# Patient Record
Sex: MALE | Race: WHITE | NOT HISPANIC OR LATINO | Employment: OTHER | URBAN - NONMETROPOLITAN AREA
[De-identification: names, ages, dates, MRNs, and addresses within clinical notes are randomized per-mention and may not be internally consistent; named-entity substitution may affect disease eponyms.]

---

## 2017-06-11 ENCOUNTER — HOSPITAL ENCOUNTER (EMERGENCY)
Facility: HOSPITAL | Age: 37
Discharge: HOME | End: 2017-06-11
Attending: EMERGENCY MEDICINE | Admitting: EMERGENCY MEDICINE

## 2017-06-11 ENCOUNTER — APPOINTMENT (OUTPATIENT)
Dept: GENERAL RADIOLOGY | Facility: HOSPITAL | Age: 37
End: 2017-06-11

## 2017-06-11 VITALS
DIASTOLIC BLOOD PRESSURE: 83 MMHG | WEIGHT: 258 LBS | HEART RATE: 90 BPM | OXYGEN SATURATION: 100 % | TEMPERATURE: 98 F | HEIGHT: 68 IN | RESPIRATION RATE: 16 BRPM | BODY MASS INDEX: 39.1 KG/M2 | SYSTOLIC BLOOD PRESSURE: 126 MMHG

## 2017-06-11 DIAGNOSIS — S90.122A CONTUSION OF LESSER TOE OF LEFT FOOT WITHOUT DAMAGE TO NAIL, INITIAL ENCOUNTER: Primary | ICD-10-CM

## 2017-06-11 PROCEDURE — 99282 EMERGENCY DEPT VISIT SF MDM: CPT

## 2017-06-11 PROCEDURE — 73630 X-RAY EXAM OF FOOT: CPT

## 2017-06-11 RX ORDER — AMITRIPTYLINE HYDROCHLORIDE 150 MG/1
100 TABLET, FILM COATED ORAL NIGHTLY
COMMUNITY

## 2017-06-11 RX ORDER — LISINOPRIL 10 MG/1
10 TABLET ORAL DAILY
COMMUNITY

## 2017-06-11 RX ORDER — PRIMIDONE 250 MG/1
250 TABLET ORAL 2 TIMES DAILY
COMMUNITY

## 2017-06-11 RX ORDER — RISPERIDONE 2 MG/1
2 TABLET ORAL 2 TIMES DAILY
COMMUNITY
End: 2020-02-16

## 2017-06-11 RX ORDER — MINOCYCLINE HYDROCHLORIDE 100 MG/1
100 CAPSULE ORAL 2 TIMES DAILY
COMMUNITY
End: 2020-02-16

## 2017-06-11 RX ORDER — METOPROLOL SUCCINATE 50 MG/1
50 TABLET, EXTENDED RELEASE ORAL DAILY
COMMUNITY

## 2017-06-11 RX ORDER — DIVALPROEX SODIUM 500 MG/1
500 TABLET, DELAYED RELEASE ORAL 2 TIMES DAILY
COMMUNITY

## 2017-06-11 RX ORDER — CETIRIZINE HYDROCHLORIDE 10 MG/1
10 TABLET ORAL DAILY
COMMUNITY

## 2017-06-11 NOTE — ED PROVIDER NOTES
Subjective   HPI Comments: 36-year-old male presents with a foot injury, he has oozing to his left fourth and fifth toe.  He is a patient of [facility] and has mental disability and is a poor historian, he was on a home visit and came back with bruising of the left fourth and fifth toe and possibly stubbing his toe.      History provided by:  Patient   used: No        Review of Systems   Musculoskeletal:        Left fourth and fifth toe bruising   Skin:        Left fourth and fifth toe bruising   All other systems reviewed and are negative.      Past Medical History:   Diagnosis Date   • Depression    • Hyperlipidemia    • Hypertension    • Intellectual disability    • Kyphosis    • Mood disorder    • Seizures        No Known Allergies    History reviewed. No pertinent surgical history.    History reviewed. No pertinent family history.    Social History     Social History   • Marital status: Single     Spouse name: N/A   • Number of children: N/A   • Years of education: N/A     Social History Main Topics   • Smoking status: Never Smoker   • Smokeless tobacco: None   • Alcohol use No   • Drug use: No   • Sexual activity: Not Asked     Other Topics Concern   • None     Social History Narrative   • None           Objective   Physical Exam   Constitutional: He is oriented to person, place, and time. He appears well-developed and well-nourished.   HENT:   Head: Normocephalic and atraumatic.   Left Ear: External ear normal.   Eyes: EOM are normal. Pupils are equal, round, and reactive to light.   Neck: Normal range of motion.   Cardiovascular: Normal rate and regular rhythm.    Pulmonary/Chest: Effort normal and breath sounds normal.   Abdominal: Soft. Bowel sounds are normal.   Musculoskeletal: Normal range of motion. He exhibits tenderness.   Left fourth and fifth toe bruising mild tenderness to palpation   Neurological: He is alert and oriented to person, place, and time.   Skin: Skin is  warm and dry.   Psychiatric: He has a normal mood and affect. His behavior is normal. Judgment and thought content normal.   Nursing note and vitals reviewed.      Procedures         ED Course  ED Course                  MDM    Final diagnoses:   Contusion of lesser toe of left foot without damage to nail, initial encounter            Howie King Jr., PA-C  06/11/17 1938

## 2017-08-18 ENCOUNTER — HOSPITAL ENCOUNTER (EMERGENCY)
Facility: HOSPITAL | Age: 37
Discharge: HOME | End: 2017-08-18
Attending: EMERGENCY MEDICINE | Admitting: EMERGENCY MEDICINE

## 2017-08-18 ENCOUNTER — APPOINTMENT (OUTPATIENT)
Dept: GENERAL RADIOLOGY | Facility: HOSPITAL | Age: 37
End: 2017-08-18

## 2017-08-18 VITALS
RESPIRATION RATE: 16 BRPM | TEMPERATURE: 97.8 F | DIASTOLIC BLOOD PRESSURE: 82 MMHG | HEIGHT: 69 IN | WEIGHT: 240 LBS | SYSTOLIC BLOOD PRESSURE: 117 MMHG | BODY MASS INDEX: 35.55 KG/M2 | OXYGEN SATURATION: 100 % | HEART RATE: 74 BPM
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
## 2017-08-18 DIAGNOSIS — R07.89 CHEST WALL PAIN: ICD-10-CM

## 2017-08-18 PROCEDURE — 71020 HC CHEST PA AND LATERAL: CPT

## 2017-08-18 PROCEDURE — 96372 THER/PROPH/DIAG INJ SC/IM: CPT

## 2017-08-18 PROCEDURE — 25010000002 KETOROLAC TROMETHAMINE PER 15 MG: Performed by: EMERGENCY MEDICINE

## 2017-08-18 PROCEDURE — 93005 ELECTROCARDIOGRAM TRACING: CPT

## 2017-08-18 PROCEDURE — 93005 ELECTROCARDIOGRAM TRACING: CPT | Performed by: EMERGENCY MEDICINE

## 2017-08-18 PROCEDURE — 99284 EMERGENCY DEPT VISIT MOD MDM: CPT

## 2017-08-18 RX ORDER — KETOROLAC TROMETHAMINE 30 MG/ML
30 INJECTION, SOLUTION INTRAMUSCULAR; INTRAVENOUS EVERY 6 HOURS PRN
Status: DISCONTINUED | OUTPATIENT
Start: 2017-08-18 | End: 2017-08-18 | Stop reason: HOSPADM

## 2017-08-18 RX ORDER — IBUPROFEN 800 MG/1
800 TABLET ORAL EVERY 8 HOURS PRN
Qty: 30 TABLET | Refills: 0 | Status: SHIPPED | OUTPATIENT
Start: 2017-08-18

## 2017-08-18 RX ORDER — KETOROLAC TROMETHAMINE 30 MG/ML
30 INJECTION, SOLUTION INTRAMUSCULAR; INTRAVENOUS ONCE
Status: DISCONTINUED | OUTPATIENT
Start: 2017-08-18 | End: 2017-08-18

## 2017-08-18 RX ADMIN — KETOROLAC TROMETHAMINE 30 MG: 30 INJECTION, SOLUTION INTRAMUSCULAR at 10:36
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
## 2022-09-07 ENCOUNTER — HOSPITAL ENCOUNTER (EMERGENCY)
Facility: HOSPITAL | Age: 42
Discharge: HOME | End: 2022-09-07
Attending: EMERGENCY MEDICINE | Admitting: EMERGENCY MEDICINE

## 2022-09-07 ENCOUNTER — APPOINTMENT (OUTPATIENT)
Dept: GENERAL RADIOLOGY | Facility: HOSPITAL | Age: 42
End: 2022-09-07

## 2022-09-07 VITALS
DIASTOLIC BLOOD PRESSURE: 96 MMHG | SYSTOLIC BLOOD PRESSURE: 144 MMHG | HEIGHT: 72 IN | TEMPERATURE: 98 F | OXYGEN SATURATION: 99 % | HEART RATE: 73 BPM | BODY MASS INDEX: 27.09 KG/M2 | RESPIRATION RATE: 16 BRPM | WEIGHT: 200 LBS

## 2022-09-07 DIAGNOSIS — R07.9 CHEST PAIN, UNSPECIFIED TYPE: Primary | ICD-10-CM

## 2022-09-07 LAB
ALBUMIN SERPL-MCNC: 3.9 G/DL (ref 3.5–5.2)
ALBUMIN/GLOB SERPL: 1.1 G/DL
ALP SERPL-CCNC: 68 U/L (ref 39–117)
ALT SERPL W P-5'-P-CCNC: 22 U/L (ref 1–41)
ANION GAP SERPL CALCULATED.3IONS-SCNC: 7.7 MMOL/L (ref 5–15)
AST SERPL-CCNC: 23 U/L (ref 1–40)
BILIRUB SERPL-MCNC: 0.2 MG/DL (ref 0–1.2)
BUN SERPL-MCNC: 12 MG/DL (ref 6–20)
BUN/CREAT SERPL: 16 (ref 7–25)
CALCIUM SPEC-SCNC: 9.4 MG/DL (ref 8.6–10.5)
CHLORIDE SERPL-SCNC: 103 MMOL/L (ref 98–107)
CO2 SERPL-SCNC: 30.3 MMOL/L (ref 22–29)
CREAT SERPL-MCNC: 0.75 MG/DL (ref 0.76–1.27)
DEPRECATED RDW RBC AUTO: 41.6 FL (ref 37–54)
EGFRCR SERPLBLD CKD-EPI 2021: 117 ML/MIN/1.73
ERYTHROCYTE [DISTWIDTH] IN BLOOD BY AUTOMATED COUNT: 12.5 % (ref 12.3–15.4)
GLOBULIN UR ELPH-MCNC: 3.4 GM/DL
GLUCOSE SERPL-MCNC: 92 MG/DL (ref 65–99)
HCT VFR BLD AUTO: 38.7 % (ref 37.5–51)
HGB BLD-MCNC: 13.7 G/DL (ref 13–17.7)
HOLD SPECIMEN: NORMAL
HOLD SPECIMEN: NORMAL
LYMPHOCYTES # BLD MANUAL: 3.38 10*3/MM3 (ref 0.7–3.1)
LYMPHOCYTES NFR BLD MANUAL: 13 % (ref 5–12)
MCH RBC QN AUTO: 32.3 PG (ref 26.6–33)
MCHC RBC AUTO-ENTMCNC: 35.4 G/DL (ref 31.5–35.7)
MCV RBC AUTO: 91.3 FL (ref 79–97)
MONOCYTES # BLD: 0.73 10*3/MM3 (ref 0.1–0.9)
NEUTROPHILS # BLD AUTO: 1.52 10*3/MM3 (ref 1.7–7)
NEUTROPHILS NFR BLD MANUAL: 26 % (ref 42.7–76)
NEUTS BAND NFR BLD MANUAL: 1 % (ref 0–5)
PLATELET # BLD AUTO: 163 10*3/MM3 (ref 140–450)
PMV BLD AUTO: 10.2 FL (ref 6–12)
POTASSIUM SERPL-SCNC: 3.8 MMOL/L (ref 3.5–5.2)
PROT SERPL-MCNC: 7.3 G/DL (ref 6–8.5)
RBC # BLD AUTO: 4.24 10*6/MM3 (ref 4.14–5.8)
RBC MORPH BLD: NORMAL
SCAN SLIDE: NORMAL
SMALL PLATELETS BLD QL SMEAR: ADEQUATE
SODIUM SERPL-SCNC: 141 MMOL/L (ref 136–145)
TROPONIN T SERPL-MCNC: <0.01 NG/ML (ref 0–0.03)
VARIANT LYMPHS NFR BLD MANUAL: 4 % (ref 0–5)
VARIANT LYMPHS NFR BLD MANUAL: 56 % (ref 19.6–45.3)
WBC MORPH BLD: NORMAL
WBC NRBC COR # BLD: 5.64 10*3/MM3 (ref 3.4–10.8)
WHOLE BLOOD HOLD COAG: NORMAL
WHOLE BLOOD HOLD SPECIMEN: NORMAL

## 2022-09-07 PROCEDURE — 71045 X-RAY EXAM CHEST 1 VIEW: CPT

## 2022-09-07 PROCEDURE — 99284 EMERGENCY DEPT VISIT MOD MDM: CPT

## 2022-09-07 PROCEDURE — 85025 COMPLETE CBC W/AUTO DIFF WBC: CPT | Performed by: EMERGENCY MEDICINE

## 2022-09-07 PROCEDURE — 85007 BL SMEAR W/DIFF WBC COUNT: CPT | Performed by: EMERGENCY MEDICINE

## 2022-09-07 PROCEDURE — 80053 COMPREHEN METABOLIC PANEL: CPT | Performed by: EMERGENCY MEDICINE

## 2022-09-07 PROCEDURE — 84484 ASSAY OF TROPONIN QUANT: CPT | Performed by: EMERGENCY MEDICINE

## 2022-09-07 PROCEDURE — 93005 ELECTROCARDIOGRAM TRACING: CPT

## 2022-09-07 RX ORDER — MIRTAZAPINE 15 MG/1
15 TABLET, FILM COATED ORAL NIGHTLY
COMMUNITY

## 2022-09-07 RX ORDER — SODIUM CHLORIDE 0.9 % (FLUSH) 0.9 %
10 SYRINGE (ML) INJECTION AS NEEDED
Status: DISCONTINUED | OUTPATIENT
Start: 2022-09-07 | End: 2022-09-08 | Stop reason: HOSPADM

## 2022-09-07 RX ORDER — LEVOCETIRIZINE DIHYDROCHLORIDE 5 MG/1
5 TABLET, FILM COATED ORAL EVERY EVENING
COMMUNITY

## 2022-09-08 NOTE — ED NOTES
Spoke with medical coordinator of [facility], she states she does not feel comfortable transporting the patient back home and requested I call EMS.

## 2022-09-08 NOTE — ED PROVIDER NOTES
Subjective       41-year-old male presents to the ED with a chief complaint of chest pain.  The patient has an intellectual disability and presents from [facility] a local facility.  He told them that he was having chest discomfort so they sent him to the ED.  On my evaluation the patient denies chest pain.  He denies shortness of breath.  No cough or wheeze.  No fever or chills.  No nausea vomiting diarrhea or abdominal pain.  Apparently the chest pain of been present for at least 1 day.  No lightheadedness or dizziness.  No prior treatments or limiting factors.  No prior evaluations.  No known history of coronary artery disease.  No other complaints at this time.          Review of Systems   Constitutional: Negative for fatigue and fever.   Respiratory: Negative for chest tightness, shortness of breath and wheezing.    Cardiovascular: Positive for chest pain. Negative for palpitations and leg swelling.   Gastrointestinal: Negative for abdominal pain, diarrhea and nausea.   All other systems reviewed and are negative.      Past Medical History:   Diagnosis Date   • Depression    • Hyperlipidemia    • Hypertension    • Intellectual disability    • Kyphosis    • Mood disorder (HCC)    • Seizures (HCC)        No Known Allergies    History reviewed. No pertinent surgical history.    History reviewed. No pertinent family history.    Social History     Socioeconomic History   • Marital status: Single   Tobacco Use   • Smoking status: Never Smoker   Substance and Sexual Activity   • Alcohol use: No   • Drug use: No           Objective   Physical Exam  Vitals and nursing note reviewed.   Constitutional:       General: He is not in acute distress.     Appearance: He is well-developed. He is not diaphoretic.   HENT:      Head: Normocephalic and atraumatic.      Nose: Nose normal.   Eyes:      Conjunctiva/sclera: Conjunctivae normal.      Pupils: Pupils are equal, round, and reactive to light.   Cardiovascular:       Rate and Rhythm: Normal rate and regular rhythm.   Pulmonary:      Effort: Pulmonary effort is normal. No respiratory distress.      Breath sounds: Normal breath sounds.   Abdominal:      General: There is no distension.      Palpations: Abdomen is soft.      Tenderness: There is no abdominal tenderness.   Musculoskeletal:         General: No deformity.   Neurological:      Mental Status: He is alert and oriented to person, place, and time.      Cranial Nerves: No cranial nerve deficit.      Coordination: Coordination normal.         Procedures           ED Course  ED Course as of 09/07/22 2226   Wed Sep 07, 2022   2154 EKG interpreted by me.  Sinus rhythm.  Rate of 70.  No ST segment or T wave changes.  Normal EKG [CG]      ED Course User Index  [CG] Vinny Rangel DO                                 Heart score of 1    PERC criteria negative    Chest x-ray negative for acute process          MDM  41-year-old male presents to the ED with a chief complaint of chest pain.  Pain is been present for 1 day.  EKG is normal.  Chest x-ray is negative for acute process.  Vital signs are stable.  Heart rate of 71.  Pulse ox 99% on room air.  Blood pressure 122/81.  Labs are reassuring.  Troponin negative.  Heart score of 1.  Patient is appropriate for discharge follow-up outpatient as needed.        Final diagnoses:   Chest pain, unspecified type       ED Disposition  ED Disposition     ED Disposition   Discharge    Condition   Stable    Comment   --             Brigitte Burns, APRN  [address], [address] [address]  [phone]               Medication List      No changes were made to your prescriptions during this visit.          Vinny Rangel DO  09/07/22 2226

## 2022-09-08 NOTE — ED NOTES
Contacted [facility] to request EMS transport back to [facility].

## 2022-09-25 ENCOUNTER — HOSPITAL ENCOUNTER (EMERGENCY)
Facility: HOSPITAL | Age: 42
Discharge: HOME | End: 2022-09-25
Attending: EMERGENCY MEDICINE | Admitting: EMERGENCY MEDICINE

## 2022-09-25 ENCOUNTER — APPOINTMENT (OUTPATIENT)
Dept: GENERAL RADIOLOGY | Facility: HOSPITAL | Age: 42
End: 2022-09-25

## 2022-09-25 VITALS
HEART RATE: 65 BPM | HEIGHT: 72 IN | OXYGEN SATURATION: 100 % | SYSTOLIC BLOOD PRESSURE: 119 MMHG | WEIGHT: 199.96 LBS | RESPIRATION RATE: 16 BRPM | TEMPERATURE: 98.3 F | DIASTOLIC BLOOD PRESSURE: 78 MMHG | BODY MASS INDEX: 27.08 KG/M2

## 2022-09-25 DIAGNOSIS — M54.50 ACUTE LOW BACK PAIN, UNSPECIFIED BACK PAIN LATERALITY, UNSPECIFIED WHETHER SCIATICA PRESENT: Primary | ICD-10-CM

## 2022-09-25 PROCEDURE — 72100 X-RAY EXAM L-S SPINE 2/3 VWS: CPT

## 2022-09-25 PROCEDURE — 99283 EMERGENCY DEPT VISIT LOW MDM: CPT

## 2022-09-25 RX ORDER — ACETAMINOPHEN 500 MG
500 TABLET ORAL ONCE
Status: COMPLETED | OUTPATIENT
Start: 2022-09-25 | End: 2022-09-25

## 2022-09-25 RX ADMIN — ACETAMINOPHEN 500 MG: 500 TABLET, FILM COATED ORAL at 20:47

## 2022-09-26 NOTE — DISCHARGE INSTRUCTIONS
Normal back X-ray. Takes OTC Tylenol or Motrin as needed for pain. Take other medications as usual. Return to ER for any new or worsening symptoms.
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
## 2023-02-15 ENCOUNTER — HOSPITAL ENCOUNTER (EMERGENCY)
Facility: HOSPITAL | Age: 43
Discharge: HOME | End: 2023-02-16
Attending: EMERGENCY MEDICINE | Admitting: EMERGENCY MEDICINE
Payer: MEDICARE

## 2023-02-15 ENCOUNTER — APPOINTMENT (OUTPATIENT)
Dept: GENERAL RADIOLOGY | Facility: HOSPITAL | Age: 43
End: 2023-02-15
Payer: MEDICARE

## 2023-02-15 DIAGNOSIS — R07.9 CHEST PAIN, UNSPECIFIED TYPE: Primary | ICD-10-CM

## 2023-02-15 PROCEDURE — 93005 ELECTROCARDIOGRAM TRACING: CPT | Performed by: EMERGENCY MEDICINE

## 2023-02-15 PROCEDURE — 93005 ELECTROCARDIOGRAM TRACING: CPT

## 2023-02-15 PROCEDURE — 99284 EMERGENCY DEPT VISIT MOD MDM: CPT

## 2023-02-15 PROCEDURE — 84484 ASSAY OF TROPONIN QUANT: CPT | Performed by: EMERGENCY MEDICINE

## 2023-02-15 PROCEDURE — 71045 X-RAY EXAM CHEST 1 VIEW: CPT

## 2023-02-15 PROCEDURE — 85025 COMPLETE CBC W/AUTO DIFF WBC: CPT | Performed by: EMERGENCY MEDICINE

## 2023-02-15 PROCEDURE — 80053 COMPREHEN METABOLIC PANEL: CPT | Performed by: EMERGENCY MEDICINE

## 2023-02-15 RX ORDER — SODIUM CHLORIDE 0.9 % (FLUSH) 0.9 %
10 SYRINGE (ML) INJECTION AS NEEDED
Status: DISCONTINUED | OUTPATIENT
Start: 2023-02-15 | End: 2023-02-16 | Stop reason: HOSPADM

## 2023-02-16 VITALS
DIASTOLIC BLOOD PRESSURE: 76 MMHG | HEART RATE: 74 BPM | SYSTOLIC BLOOD PRESSURE: 105 MMHG | WEIGHT: 200 LBS | TEMPERATURE: 98.2 F | RESPIRATION RATE: 20 BRPM | OXYGEN SATURATION: 98 %

## 2023-02-16 LAB
ALBUMIN SERPL-MCNC: 3.7 G/DL (ref 3.5–5.2)
ALBUMIN/GLOB SERPL: 1.1 G/DL
ALP SERPL-CCNC: 54 U/L (ref 39–117)
ALT SERPL W P-5'-P-CCNC: 12 U/L (ref 1–41)
ANION GAP SERPL CALCULATED.3IONS-SCNC: 6.8 MMOL/L (ref 5–15)
AST SERPL-CCNC: 20 U/L (ref 1–40)
B PARAPERT DNA SPEC QL NAA+PROBE: NOT DETECTED
B PERT DNA SPEC QL NAA+PROBE: NOT DETECTED
BASOPHILS # BLD AUTO: 0.03 10*3/MM3 (ref 0–0.2)
BASOPHILS NFR BLD AUTO: 0.7 % (ref 0–1.5)
BILIRUB SERPL-MCNC: 0.3 MG/DL (ref 0–1.2)
BUN SERPL-MCNC: 14 MG/DL (ref 6–20)
BUN/CREAT SERPL: 18.9 (ref 7–25)
C PNEUM DNA NPH QL NAA+NON-PROBE: NOT DETECTED
CALCIUM SPEC-SCNC: 8.9 MG/DL (ref 8.6–10.5)
CHLORIDE SERPL-SCNC: 102 MMOL/L (ref 98–107)
CO2 SERPL-SCNC: 29.2 MMOL/L (ref 22–29)
CREAT SERPL-MCNC: 0.74 MG/DL (ref 0.76–1.27)
DEPRECATED RDW RBC AUTO: 38.1 FL (ref 37–54)
EGFRCR SERPLBLD CKD-EPI 2021: 116.7 ML/MIN/1.73
EOSINOPHIL # BLD AUTO: 0.03 10*3/MM3 (ref 0–0.4)
EOSINOPHIL NFR BLD AUTO: 0.7 % (ref 0.3–6.2)
ERYTHROCYTE [DISTWIDTH] IN BLOOD BY AUTOMATED COUNT: 11.9 % (ref 12.3–15.4)
FLUAV SUBTYP SPEC NAA+PROBE: NOT DETECTED
FLUBV RNA ISLT QL NAA+PROBE: NOT DETECTED
GLOBULIN UR ELPH-MCNC: 3.3 GM/DL
GLUCOSE SERPL-MCNC: 93 MG/DL (ref 65–99)
HADV DNA SPEC NAA+PROBE: NOT DETECTED
HCOV 229E RNA SPEC QL NAA+PROBE: NOT DETECTED
HCOV HKU1 RNA SPEC QL NAA+PROBE: NOT DETECTED
HCOV NL63 RNA SPEC QL NAA+PROBE: NOT DETECTED
HCOV OC43 RNA SPEC QL NAA+PROBE: NOT DETECTED
HCT VFR BLD AUTO: 33.1 % (ref 37.5–51)
HGB BLD-MCNC: 11.9 G/DL (ref 13–17.7)
HMPV RNA NPH QL NAA+NON-PROBE: NOT DETECTED
HOLD SPECIMEN: NORMAL
HOLD SPECIMEN: NORMAL
HPIV1 RNA ISLT QL NAA+PROBE: NOT DETECTED
HPIV2 RNA SPEC QL NAA+PROBE: NOT DETECTED
HPIV3 RNA NPH QL NAA+PROBE: NOT DETECTED
HPIV4 P GENE NPH QL NAA+PROBE: NOT DETECTED
IMM GRANULOCYTES # BLD AUTO: 0.01 10*3/MM3 (ref 0–0.05)
IMM GRANULOCYTES NFR BLD AUTO: 0.2 % (ref 0–0.5)
LYMPHOCYTES # BLD AUTO: 2.48 10*3/MM3 (ref 0.7–3.1)
LYMPHOCYTES NFR BLD AUTO: 54 % (ref 19.6–45.3)
M PNEUMO IGG SER IA-ACNC: NOT DETECTED
MCH RBC QN AUTO: 32.1 PG (ref 26.6–33)
MCHC RBC AUTO-ENTMCNC: 36 G/DL (ref 31.5–35.7)
MCV RBC AUTO: 89.2 FL (ref 79–97)
MONOCYTES # BLD AUTO: 0.54 10*3/MM3 (ref 0.1–0.9)
MONOCYTES NFR BLD AUTO: 11.8 % (ref 5–12)
NEUTROPHILS NFR BLD AUTO: 1.5 10*3/MM3 (ref 1.7–7)
NEUTROPHILS NFR BLD AUTO: 32.6 % (ref 42.7–76)
NRBC BLD AUTO-RTO: 0 /100 WBC (ref 0–0.2)
PLATELET # BLD AUTO: 143 10*3/MM3 (ref 140–450)
PMV BLD AUTO: 11 FL (ref 6–12)
POTASSIUM SERPL-SCNC: 4.2 MMOL/L (ref 3.5–5.2)
PROT SERPL-MCNC: 7 G/DL (ref 6–8.5)
RBC # BLD AUTO: 3.71 10*6/MM3 (ref 4.14–5.8)
RHINOVIRUS RNA SPEC NAA+PROBE: NOT DETECTED
RSV RNA NPH QL NAA+NON-PROBE: NOT DETECTED
SARS-COV-2 RNA NPH QL NAA+NON-PROBE: NOT DETECTED
SODIUM SERPL-SCNC: 138 MMOL/L (ref 136–145)
TROPONIN T SERPL HS-MCNC: <6 NG/L
WBC NRBC COR # BLD: 4.59 10*3/MM3 (ref 3.4–10.8)
WHOLE BLOOD HOLD COAG: NORMAL
WHOLE BLOOD HOLD SPECIMEN: NORMAL

## 2023-02-16 PROCEDURE — 0202U NFCT DS 22 TRGT SARS-COV-2: CPT | Performed by: EMERGENCY MEDICINE

## 2023-02-16 PROCEDURE — 93005 ELECTROCARDIOGRAM TRACING: CPT | Performed by: EMERGENCY MEDICINE

## 2023-02-16 NOTE — ED PROVIDER NOTES
Subjective   History of Present Illness  42-year-old male with a history of MR presents to the ED from home via EMS for chief complaint of chest pain.  Patient is a poor historian but notes chest pain for a few days.  Dull aching pain does not radiate.  States that he has had a cough as well.  No fever or chills.  No nausea vomiting diarrhea or abdominal pain.  No other complaints at this time.  No shortness of breath        Review of Systems   Constitutional: Negative for fatigue and fever.   Respiratory: Positive for cough. Negative for chest tightness, shortness of breath and wheezing.    Cardiovascular: Positive for chest pain. Negative for palpitations and leg swelling.   All other systems reviewed and are negative.      Past Medical History:   Diagnosis Date   • Anxiety    • Depression    • Hypertension    • Seizures (HCC)        Allergies   Allergen Reactions   • Penicillins Unknown - High Severity       History reviewed. No pertinent surgical history.    History reviewed. No pertinent family history.    Social History     Socioeconomic History   • Marital status: Single           Objective   Physical Exam  Vitals and nursing note reviewed.   Constitutional:       General: He is not in acute distress.     Appearance: He is well-developed. He is not ill-appearing.   HENT:      Head: Normocephalic and atraumatic.   Cardiovascular:      Rate and Rhythm: Normal rate and regular rhythm.      Heart sounds: Normal heart sounds. Heart sounds not distant. No murmur heard.  Pulmonary:      Effort: Pulmonary effort is normal.      Breath sounds: Normal breath sounds. No decreased breath sounds.   Chest:      Chest wall: No mass or tenderness.   Neurological:      Mental Status: He is alert.         Procedures           ED Course  ED Course as of 02/16/23 0146   Thu Feb 16, 2023   0039 EKG interpreted by me.  Sinus rhythm.  Tachycardic.  Rate of 101.  Occasional PVC.  Nonspecific Q wave.  No ST segment or T wave changes.   Abnormal EKG without acute ischemic changes [CG]   0100 Repeat EKG interpreted by me.  Sinus rhythm.  Rate of 84.  No obvious ST or T wave changes.  Nonspecific Q wave.  Abnormal EKG without obvious ischemic changes. [CG]      ED Course User Index  [CG] Juan CarlosVinny, DO                                 PULSE OXIMETRY INTERPRETATION  Patient had a pulse ox of 96% on room air. This is a normal pulse oximetry reading.          MDM  Chief complaint: Chest pain    Initial impression of presenting illness: 42-year-old male with chest pain    Comorbidities requiring consideration and/or management: Anxiety, depression, seizures, hypertension, MR    Differential diagnosis includes but not limited to: Viral URI, pneumonia, pleurisy, STEMI, NSTEMI, ACS, other    Patient arrives hemodynamically stable, afebrile, without respiratory distress with initial vitals interpreted by myself.  Pertinent initial vitals include heart rate of 80, blood pressure 96/70, temp of 98.2, pulse ox of 96% on room air, respiratory rate of 20    Pertinent features from physical exam: Breath sounds clear and equal bilaterally.  Abdomen soft nontender nondistended, heart regular rate and rhythm, radial pulses normal bilaterally    Initial diagnostic plan: CBC, CMP, EKG, high-sensitivity troponin, chest x-ray    Results from initial plan were reviewed and interpreted by myself and include: EKG does not show any acute ischemic changes,      Diagnostic information from other sources includes: Review of previous visits, prior labs, prior imaging, available notes from prior evaluations or visits with specialists, medication list, allergies, past medical history, past surgical history    Interventions in the ED included: Aspirin and nitro given per EMS, continuous cardiac monitoring, continuous pulse oximetry    Reevaluation: Resting comfortably    Results/clinical rationale were discussed with patient and family over the phone    Consultations and  discussions of results with other physicians: N/AA    Discussion of results/management/plan: Patient entered under the wrong chart.  On chart review of [place], with the same birthdate there is no known history of cardiac disease.  Does have a history of hypertension.  Pain has been present for 2 days high-sensitivity troponin was negative.  Heart score of 1.  Higher suspicion for acute viral URI with associated cough.  No signs of myocarditis, endocarditis or pericarditis.  He is resting comfortably.  Currently pain-free.  Appropriate for discharge follow-up outpatient as needed.    Disposition plan: Discharge home, follow-up outpatient as needed      Final diagnoses:   Chest pain, unspecified type       ED Disposition  ED Disposition     ED Disposition   Discharge    Condition   Stable    Comment   --             Brigitte Burns, APRN  [address], [address] [address] [phone]               Medication List      No changes were made to your prescriptions during this visit.          Vinny Rangel,   02/16/23 0146

## 2023-09-19 ENCOUNTER — OFFICE VISIT (OUTPATIENT)
Dept: UROLOGY | Facility: CLINIC | Age: 43
End: 2023-09-19
Payer: MEDICARE

## 2023-09-19 VITALS
HEIGHT: 71 IN | TEMPERATURE: 97.7 F | DIASTOLIC BLOOD PRESSURE: 78 MMHG | OXYGEN SATURATION: 98 % | SYSTOLIC BLOOD PRESSURE: 118 MMHG | HEART RATE: 69 BPM | BODY MASS INDEX: 32.48 KG/M2 | WEIGHT: 232 LBS

## 2023-09-19 DIAGNOSIS — I86.1 VARICOCELE: Primary | ICD-10-CM

## 2023-09-19 RX ORDER — GUAIFENESIN 600 MG/1
1200 TABLET, EXTENDED RELEASE ORAL AS NEEDED
COMMUNITY

## 2023-09-19 RX ORDER — DEXTROMETHORPHAN POLISTIREX 30 MG/5ML
5 SUSPENSION ORAL AS NEEDED
COMMUNITY

## 2023-09-19 RX ORDER — RISPERIDONE 1 MG/1
1 TABLET ORAL 2 TIMES DAILY
COMMUNITY

## 2023-09-19 RX ORDER — ACETAMINOPHEN 500 MG
500 TABLET ORAL AS NEEDED
COMMUNITY

## 2023-09-19 RX ORDER — DOCUSATE SODIUM 100 MG/1
100 CAPSULE, LIQUID FILLED ORAL 3 TIMES DAILY
COMMUNITY

## 2023-09-19 RX ORDER — POLYETHYLENE GLYCOL 3350 17 G/17G
17 POWDER, FOR SOLUTION ORAL
COMMUNITY

## 2023-09-19 RX ORDER — FAMOTIDINE 20 MG/1
20 TABLET, FILM COATED ORAL 2 TIMES DAILY
COMMUNITY

## 2023-09-19 RX ORDER — CLINDAMYCIN PHOSPHATE 10 UG/ML
LOTION TOPICAL EVERY 12 HOURS SCHEDULED
COMMUNITY

## 2023-09-19 RX ORDER — PANTOPRAZOLE SODIUM 40 MG/1
1 TABLET, DELAYED RELEASE ORAL DAILY
COMMUNITY
Start: 2023-08-25

## 2023-09-19 RX ORDER — FLUTICASONE PROPIONATE 50 MCG
SPRAY, SUSPENSION (ML) NASAL EVERY 24 HOURS
COMMUNITY

## 2023-09-19 NOTE — PROGRESS NOTES
Chief Complaint  Left testicular pain    Referring Provider  Yazmin Morrison, AP*    HPI  Mr. Hill is a 42 y.o. male with history of MR who presents with intermittent and chronic left testicular pain x 1 months.    He states it is always present, never fully relieved.    Provocative factors:  Activity makes it worse  Palliative factors:   Rest makes it better  Radiation:   None  Previous treatments: Ibuprofen    None current LUTS  No history of vasectomy  No history of kidney stones, recent trauma or injury.  Yes history of constipation      Past Medical History  Past Medical History:   Diagnosis Date    Acne     Anxiety     Depression     Hyperlipidemia     Hypertension     Intellectual disability     Kyphosis     Mood disorder     Seizures        Past Surgical History  History reviewed. No pertinent surgical history.    Medications    Current Outpatient Medications:     acetaminophen (TYLENOL) 500 MG tablet, Take 1 tablet by mouth As Needed for Mild Pain., Disp: , Rfl:     amitriptyline (ELAVIL) 150 MG tablet, Take 100 mg by mouth Every Night., Disp: , Rfl:     bismuth subsalicylate (PEPTO BISMOL) 262 MG/15ML suspension, Take 30 mL by mouth As Needed for Indigestion., Disp: , Rfl:     clindamycin (CLEOCIN T) 1 % lotion, Every 12 (Twelve) Hours., Disp: , Rfl:     dextromethorphan polistirex ER (DELSYM) 30 MG/5ML Suspension Extended Release oral suspension, Take 5 mL by mouth As Needed., Disp: , Rfl:     divalproex (DEPAKOTE) 500 MG DR tablet, Take 1 tablet by mouth 2 (Two) Times a Day., Disp: , Rfl:     docusate sodium (COLACE) 100 MG capsule, Take 1 capsule by mouth 3 (Three) Times a Day., Disp: , Rfl:     famotidine (PEPCID) 20 MG tablet, Take 1 tablet by mouth 2 (Two) Times a Day., Disp: , Rfl:     fluticasone (FLONASE) 50 MCG/ACT nasal spray, Daily., Disp: , Rfl:     guaiFENesin (MUCINEX) 600 MG 12 hr tablet, Take 2 tablets by mouth As Needed for Cough., Disp: , Rfl:     ibuprofen (ADVIL,MOTRIN) 800 MG  tablet, Take 1 tablet by mouth Every 8 (Eight) Hours As Needed for Mild Pain ., Disp: 30 tablet, Rfl: 0    lisinopril (PRINIVIL,ZESTRIL) 10 MG tablet, Take 1 tablet by mouth Daily., Disp: , Rfl:     metoprolol succinate XL (TOPROL-XL) 50 MG 24 hr tablet, Take 1 tablet by mouth Daily., Disp: , Rfl:     pantoprazole (PROTONIX) 40 MG EC tablet, Take 1 tablet by mouth Daily., Disp: , Rfl:     polyethylene glycol (ClearLax) 17 GM/SCOOP powder, Take 17 g by mouth Every 30 (Thirty) Days., Disp: , Rfl:     primidone (MYSOLINE) 250 MG tablet, Take 1 tablet by mouth 2 (Two) Times a Day., Disp: , Rfl:     risperiDONE (risperDAL) 1 MG tablet, Take 1 tablet by mouth 2 (Two) Times a Day., Disp: , Rfl:     sertraline (ZOLOFT) 50 MG tablet, Take 3 tablets by mouth Daily., Disp: , Rfl:     vitamin B-12 (CYANOCOBALAMIN) 1000 MCG tablet, Take 1 tablet by mouth Daily., Disp: , Rfl:     diclofenac (VOLTAREN) 50 MG EC tablet, Take 1 tablet by mouth 3 (Three) Times a Day for 21 days., Disp: 63 tablet, Rfl: 0    hydrOXYzine pamoate (VISTARIL) 25 MG capsule, Take 25 mg by mouth 2 (Two) Times a Day. (Patient not taking: Reported on 9/19/2023), Disp: , Rfl:     levocetirizine (XYZAL) 5 MG tablet, Take 5 mg by mouth Every Evening. (Patient not taking: Reported on 9/19/2023), Disp: , Rfl:     mirtazapine (REMERON) 15 MG tablet, Take 15 mg by mouth Every Night. (Patient not taking: Reported on 9/19/2023), Disp: , Rfl:     Allergies  Allergies   Allergen Reactions    Apple Anaphylaxis and Hives    Oatmeal Anaphylaxis and Hives    Peach [Prunus Persica] Anaphylaxis and Hives    Penicillins Unknown - High Severity       Social History  Social History     Socioeconomic History    Marital status: Single   Tobacco Use    Smoking status: Never    Smokeless tobacco: Never   Vaping Use    Vaping Use: Never used   Substance and Sexual Activity    Alcohol use: Never    Drug use: Never    Sexual activity: Defer         Physical Exam  Visit Vitals  /78  "(BP Location: Left arm, Patient Position: Sitting, Cuff Size: Adult)   Pulse 69   Temp 97.7 °F (36.5 °C) (Temporal)   Ht 180.3 cm (71\")   Wt 105 kg (232 lb)   SpO2 98%   BMI 32.36 kg/m²     Physical exam revealed small tender bilateral varicoceles.    Genitourinary  Penis: circumcised penis, glans normal, no penile discharge.  No rashes/lesions.    Testes: descended bilaterally, no masses, LEFT epididymis nontender to palpation,  RIGHT epididymis nontender to palpation.  Small bilateral varicoceles.  No hernia appreciated.      Labs  Brief Urine Lab Results       None            Lab Results   Component Value Date    GLUCOSE 93 02/15/2023    CALCIUM 8.9 02/15/2023     02/15/2023    K 4.2 02/15/2023    CO2 29.2 (H) 02/15/2023     02/15/2023    BUN 14 02/15/2023    CREATININE 0.74 (L) 02/15/2023    BCR 18.9 02/15/2023    ANIONGAP 6.8 02/15/2023       Radiologic Studies  Ultrasound scrotum and contents    Results: The right testicle is 4.2 x 2.3 x 3.0 cm and the left testicle is 4.3 x 2.3 x 3.1 cm.  Both have homogenous parenchyma and symmetric flow.  There is no hydrocele.  A left varicocele is present.    Conclusion: Normal testicle with left hydrocele.    Electronically signed by LISBETH PICKARD M.D. 8/8/2023        Assessment  Mr. Hill is a 42 y.o. male who presents with bilateral constant scrotal pain for the past month.    Ultrasound obtained prior to referral revealed left varicocele that is confirmed with exam.  Suspect there was a dictation error by the radiologist given no hydrocele originally dictated and results with conclusion documenting hydrocele, probably was supposed to say varicocele.    We discussed different strategies for management including conservative therapy, pain medications (gabapentin, nortriptyline) and surgical interventions (cord denervation).    I also discussed the importance of limiting straining on the abdomen.  The patient tells me he has to strain to have a bowel " movement every day and I recommend his MiraLAX be increased by his facility.    Plan  1.  Scrotal support continuously, especially with increased activity.  2.  Trial of diclofenac  3.  Increase constipation management with increasing MiraLAX    Follow-up in 3 to 4 weeks for symptom reassessment    Tasha Berry PA-C

## 2023-09-20 ENCOUNTER — TELEPHONE (OUTPATIENT)
Dept: UROLOGY | Facility: CLINIC | Age: 43
End: 2023-09-20

## 2023-09-20 NOTE — TELEPHONE ENCOUNTER
"Provider: WILFRID WILSON    Caller: PHARMACY    Relationship to Patient:      Phone Number: [phone]    Reason for Call: DICLOFENAC    When was the patient last seen: 09/19/23    Notes: PHARMACIST SAYS THAT DICLOFENAC HAS A MAJOR DRUG REACTION WITH ZOLOFT. THERE IS A RISK OF \"GI BLEED.\"    PLEASE REACH OUT TO PHARMACY TO LET THEM KNOW WHETHER OR NOT TO FILL THE MEDICATION.       ---UNABLE TO WARM TRANSFER    "

## 2023-09-21 NOTE — TELEPHONE ENCOUNTER
Discussed drug interaction with the pharmacist.  NSAIDs and Zoloft are known to have the possibility of GI bleeding as an interaction.  Generally, this does not occur.  Pharmacist agrees, it is unlikely to occur.  We also discussed that alternatives for pain management such as gabapentin or nortriptyline would be much more likely to cause side effects for this patient given his other current medications.    He lives in a facility and is closely monitored by the staff there.  If he develops any signs of GI bleeding, medication can be stopped immediately and medical attention sought.  Pharmacist agrees and will relay this information.  We will change the dosing to twice daily to further decrease the possibility of drug interaction.

## 2023-10-10 ENCOUNTER — OFFICE VISIT (OUTPATIENT)
Dept: UROLOGY | Facility: CLINIC | Age: 43
End: 2023-10-10
Payer: MEDICARE

## 2023-10-10 VITALS
RESPIRATION RATE: 15 BRPM | TEMPERATURE: 97.8 F | HEART RATE: 63 BPM | DIASTOLIC BLOOD PRESSURE: 80 MMHG | OXYGEN SATURATION: 96 % | SYSTOLIC BLOOD PRESSURE: 120 MMHG

## 2023-10-10 DIAGNOSIS — I86.1 VARICOCELE: Primary | ICD-10-CM

## 2023-10-10 NOTE — PROGRESS NOTES
Chief Complaint   Patient presents with    varicocele    Follow-up        HPI  Mr. Hill is a 43 y.o. male with history of G2 left varicocele who presents for follow up.     At this visit, has managed his constipation, now having easy Bms daily. Has taken diclofenac TID. Pain has improved, but still present more often than not     Past Medical History:   Diagnosis Date    Acne     Anxiety     Depression     Hyperlipidemia     Hypertension     Intellectual disability     Kyphosis     Mood disorder     Seizures        History reviewed. No pertinent surgical history.      Current Outpatient Medications:     acetaminophen (TYLENOL) 500 MG tablet, Take 1 tablet by mouth As Needed for Mild Pain., Disp: , Rfl:     amitriptyline (ELAVIL) 150 MG tablet, Take 100 mg by mouth Every Night., Disp: , Rfl:     bismuth subsalicylate (PEPTO BISMOL) 262 MG/15ML suspension, Take 30 mL by mouth As Needed for Indigestion., Disp: , Rfl:     clindamycin (CLEOCIN T) 1 % lotion, Every 12 (Twelve) Hours., Disp: , Rfl:     dextromethorphan polistirex ER (DELSYM) 30 MG/5ML Suspension Extended Release oral suspension, Take 5 mL by mouth As Needed., Disp: , Rfl:     diclofenac (VOLTAREN) 50 MG EC tablet, Take 1 tablet by mouth 3 (Three) Times a Day for 21 days., Disp: 63 tablet, Rfl: 0    divalproex (DEPAKOTE) 500 MG DR tablet, Take 1 tablet by mouth 2 (Two) Times a Day., Disp: , Rfl:     docusate sodium (COLACE) 100 MG capsule, Take 1 capsule by mouth 3 (Three) Times a Day., Disp: , Rfl:     famotidine (PEPCID) 20 MG tablet, Take 1 tablet by mouth 2 (Two) Times a Day., Disp: , Rfl:     fluticasone (FLONASE) 50 MCG/ACT nasal spray, Daily., Disp: , Rfl:     guaiFENesin (MUCINEX) 600 MG 12 hr tablet, Take 2 tablets by mouth As Needed for Cough., Disp: , Rfl:     ibuprofen (ADVIL,MOTRIN) 800 MG tablet, Take 1 tablet by mouth Every 8 (Eight) Hours As Needed for Mild Pain ., Disp: 30 tablet, Rfl: 0    lisinopril (PRINIVIL,ZESTRIL) 10 MG tablet, Take  1 tablet by mouth Daily., Disp: , Rfl:     metoprolol succinate XL (TOPROL-XL) 50 MG 24 hr tablet, Take 1 tablet by mouth Daily., Disp: , Rfl:     pantoprazole (PROTONIX) 40 MG EC tablet, Take 1 tablet by mouth Daily., Disp: , Rfl:     polyethylene glycol (ClearLax) 17 GM/SCOOP powder, Take 17 g by mouth Every 30 (Thirty) Days., Disp: , Rfl:     primidone (MYSOLINE) 250 MG tablet, Take 1 tablet by mouth 2 (Two) Times a Day., Disp: , Rfl:     risperiDONE (risperDAL) 1 MG tablet, Take 1 tablet by mouth 2 (Two) Times a Day., Disp: , Rfl:     sertraline (ZOLOFT) 50 MG tablet, Take 3 tablets by mouth Daily., Disp: , Rfl:     vitamin B-12 (CYANOCOBALAMIN) 1000 MCG tablet, Take 1 tablet by mouth Daily., Disp: , Rfl:     hydrOXYzine pamoate (VISTARIL) 25 MG capsule, Take 25 mg by mouth 2 (Two) Times a Day. (Patient not taking: Reported on 9/19/2023), Disp: , Rfl:     levocetirizine (XYZAL) 5 MG tablet, Take 5 mg by mouth Every Evening. (Patient not taking: Reported on 9/19/2023), Disp: , Rfl:     mirtazapine (REMERON) 15 MG tablet, Take 15 mg by mouth Every Night. (Patient not taking: Reported on 9/19/2023), Disp: , Rfl:      Physical Exam  Visit Vitals  /80 (BP Location: Right arm, Patient Position: Sitting, Cuff Size: Adult)   Pulse 63   Temp 97.8 øF (36.6 øC) (Temporal)   Resp 15   SpO2 96%       Labs  Brief Urine Lab Results       None            Lab Results   Component Value Date    GLUCOSE 93 02/15/2023    CALCIUM 8.9 02/15/2023     02/15/2023    K 4.2 02/15/2023    CO2 29.2 (H) 02/15/2023     02/15/2023    BUN 14 02/15/2023    CREATININE 0.74 (L) 02/15/2023    BCR 18.9 02/15/2023    ANIONGAP 6.8 02/15/2023       Lab Results   Component Value Date    WBC 4.59 02/15/2023    HGB 11.9 (L) 02/15/2023    HCT 33.1 (L) 02/15/2023    MCV 89.2 02/15/2023     02/15/2023         IMAGING:          Assessment  43 y.o. male with hx of chronic scrotal pain, G2 left varicocele.    I recommended conservative  management for things that are provoking and worsening his pain.  We particularly did not find constipation and straining as a risk factor.  That has since been managed with daily MiraLAX, now having daily BMs that are much less effort.  He has been taking diclofenac.  He states his pain is somewhat better, but still daily, still bothersome, still desiring treatment.      We briefly discussed varicocelectomy, what to expect, anticipated recovery.  Given the patient's medical history with intellectual disability living in an adult home, I recommend his medical power of  or family member be present at next appointment to fully discussed the risks versus benefits of varicocelectomy and consent for surgery.    Plan  1.  Continue diclofenac as needed  2.  Follow-up with Dr. Cash and medical POA for detailed discussion of management

## 2023-10-19 ENCOUNTER — OFFICE VISIT (OUTPATIENT)
Dept: UROLOGY | Facility: CLINIC | Age: 43
End: 2023-10-19
Payer: MEDICARE

## 2023-10-19 DIAGNOSIS — I86.1 VARICOCELE: Primary | ICD-10-CM

## 2023-10-19 NOTE — PROGRESS NOTES
CC  Varicocele    HPI  Mr. Hill is a 43 y.o. male with history below in assessment, who presents for follow up.     At this visit c/o right groin and perineal pain as well    Past Medical History:   Diagnosis Date    Acne     Anxiety     Depression     Hyperlipidemia     Hypertension     Intellectual disability     Kyphosis     Mood disorder     Seizures        No past surgical history on file.      Current Outpatient Medications:     acetaminophen (TYLENOL) 500 MG tablet, Take 1 tablet by mouth As Needed for Mild Pain., Disp: , Rfl:     amitriptyline (ELAVIL) 150 MG tablet, Take 100 mg by mouth Every Night., Disp: , Rfl:     bismuth subsalicylate (PEPTO BISMOL) 262 MG/15ML suspension, Take 30 mL by mouth As Needed for Indigestion., Disp: , Rfl:     clindamycin (CLEOCIN T) 1 % lotion, Every 12 (Twelve) Hours., Disp: , Rfl:     dextromethorphan polistirex ER (DELSYM) 30 MG/5ML Suspension Extended Release oral suspension, Take 5 mL by mouth As Needed., Disp: , Rfl:     divalproex (DEPAKOTE) 500 MG DR tablet, Take 1 tablet by mouth 2 (Two) Times a Day., Disp: , Rfl:     docusate sodium (COLACE) 100 MG capsule, Take 1 capsule by mouth 3 (Three) Times a Day., Disp: , Rfl:     famotidine (PEPCID) 20 MG tablet, Take 1 tablet by mouth 2 (Two) Times a Day., Disp: , Rfl:     fluticasone (FLONASE) 50 MCG/ACT nasal spray, Daily., Disp: , Rfl:     guaiFENesin (MUCINEX) 600 MG 12 hr tablet, Take 2 tablets by mouth As Needed for Cough., Disp: , Rfl:     hydrOXYzine pamoate (VISTARIL) 25 MG capsule, Take 25 mg by mouth 2 (Two) Times a Day. (Patient not taking: Reported on 9/19/2023), Disp: , Rfl:     ibuprofen (ADVIL,MOTRIN) 800 MG tablet, Take 1 tablet by mouth Every 8 (Eight) Hours As Needed for Mild Pain ., Disp: 30 tablet, Rfl: 0    levocetirizine (XYZAL) 5 MG tablet, Take 5 mg by mouth Every Evening. (Patient not taking: Reported on 9/19/2023), Disp: , Rfl:     lisinopril (PRINIVIL,ZESTRIL) 10 MG tablet, Take 1 tablet by  "mouth Daily., Disp: , Rfl:     metoprolol succinate XL (TOPROL-XL) 50 MG 24 hr tablet, Take 1 tablet by mouth Daily., Disp: , Rfl:     mirtazapine (REMERON) 15 MG tablet, Take 15 mg by mouth Every Night. (Patient not taking: Reported on 9/19/2023), Disp: , Rfl:     pantoprazole (PROTONIX) 40 MG EC tablet, Take 1 tablet by mouth Daily., Disp: , Rfl:     polyethylene glycol (ClearLax) 17 GM/SCOOP powder, Take 17 g by mouth Every 30 (Thirty) Days., Disp: , Rfl:     primidone (MYSOLINE) 250 MG tablet, Take 1 tablet by mouth 2 (Two) Times a Day., Disp: , Rfl:     risperiDONE (risperDAL) 1 MG tablet, Take 1 tablet by mouth 2 (Two) Times a Day., Disp: , Rfl:     sertraline (ZOLOFT) 50 MG tablet, Take 3 tablets by mouth Daily., Disp: , Rfl:     vitamin B-12 (CYANOCOBALAMIN) 1000 MCG tablet, Take 1 tablet by mouth Daily., Disp: , Rfl:      Physical Exam  There were no vitals taken for this visit.  On exam he shows me that he has pain in the right groin, as well in the perineum, in addition to the scrotum.  He does have a grade 2 left varicocele.  No hernias.  Circumcised.  Testicles descended bilaterally.  Labs  Brief Urine Lab Results       None            Lab Results   Component Value Date    GLUCOSE 93 02/15/2023    CALCIUM 8.9 02/15/2023     02/15/2023    K 4.2 02/15/2023    CO2 29.2 (H) 02/15/2023     02/15/2023    BUN 14 02/15/2023    CREATININE 0.74 (L) 02/15/2023    BCR 18.9 02/15/2023    ANIONGAP 6.8 02/15/2023       Lab Results   Component Value Date    WBC 4.59 02/15/2023    HGB 11.9 (L) 02/15/2023    HCT 33.1 (L) 02/15/2023    MCV 89.2 02/15/2023     02/15/2023          No results found for: \"PSA\"          Radiographic Studies  No Images in the past 120 days found..      I have reviewed above labs and imaging.     Assessment  43 y.o. male with left grade 2 varicocele, but really has pelvic floor pain. No hernia on exam    Plan  1. Referral to PFPT  2. Fu in 6 mo. If still having pain we could " always try IR embolization

## 2023-10-24 ENCOUNTER — TELEPHONE (OUTPATIENT)
Dept: UROLOGY | Facility: CLINIC | Age: 43
End: 2023-10-24
Payer: MEDICARE

## 2023-10-24 NOTE — TELEPHONE ENCOUNTER
Caller: DONALDO    Relationship: [facility]    Best call back number: [phone]    What is the medical concern/diagnosis: PELVIC FLOOR    What specialty or service is being requested: PHYSICAL THERAPY    What is the provider, practice or medical service name: [facility]    What is the office location: [address] [place] [address]    What is the office phone number: [phone]    Any additional details: FAX:[phone]  PLEASE FAX REFERRAL AND DEMOGRAPHICS

## 2024-04-22 ENCOUNTER — OFFICE VISIT (OUTPATIENT)
Dept: UROLOGY | Facility: CLINIC | Age: 44
End: 2024-04-22
Payer: MEDICARE

## 2024-04-22 VITALS
BODY MASS INDEX: 32.48 KG/M2 | HEIGHT: 71 IN | OXYGEN SATURATION: 99 % | WEIGHT: 232 LBS | SYSTOLIC BLOOD PRESSURE: 110 MMHG | DIASTOLIC BLOOD PRESSURE: 72 MMHG | HEART RATE: 66 BPM

## 2024-04-22 DIAGNOSIS — R10.2 PELVIC PAIN: Primary | ICD-10-CM

## 2024-04-22 PROCEDURE — 99212 OFFICE O/P EST SF 10 MIN: CPT | Performed by: UROLOGY

## 2024-04-22 NOTE — PROGRESS NOTES
CC  Varicocele    HPI  Mr. Hill is a 43 y.o. male with history below in assessment, who presents for follow up.     At this visit pain resolved    Past Medical History:   Diagnosis Date    Acne     Anxiety     Depression     Hyperlipidemia     Hypertension     Intellectual disability     Kyphosis     Mood disorder     Seizures        History reviewed. No pertinent surgical history.      Current Outpatient Medications:     acetaminophen (TYLENOL) 500 MG tablet, Take 1 tablet by mouth As Needed for Mild Pain., Disp: , Rfl:     amitriptyline (ELAVIL) 150 MG tablet, Take 100 mg by mouth Every Night., Disp: , Rfl:     bismuth subsalicylate (PEPTO BISMOL) 262 MG/15ML suspension, Take 30 mL by mouth As Needed for Indigestion., Disp: , Rfl:     clindamycin (CLEOCIN T) 1 % lotion, Every 12 (Twelve) Hours., Disp: , Rfl:     dextromethorphan polistirex ER (DELSYM) 30 MG/5ML Suspension Extended Release oral suspension, Take 5 mL by mouth As Needed., Disp: , Rfl:     divalproex (DEPAKOTE) 500 MG DR tablet, Take 1 tablet by mouth 2 (Two) Times a Day., Disp: , Rfl:     docusate sodium (COLACE) 100 MG capsule, Take 1 capsule by mouth 3 (Three) Times a Day., Disp: , Rfl:     famotidine (PEPCID) 20 MG tablet, Take 1 tablet by mouth 2 (Two) Times a Day., Disp: , Rfl:     fluticasone (FLONASE) 50 MCG/ACT nasal spray, Daily., Disp: , Rfl:     guaiFENesin (MUCINEX) 600 MG 12 hr tablet, Take 2 tablets by mouth As Needed for Cough., Disp: , Rfl:     hydrOXYzine pamoate (VISTARIL) 25 MG capsule, Take 1 capsule by mouth 2 (Two) Times a Day., Disp: , Rfl:     ibuprofen (ADVIL,MOTRIN) 800 MG tablet, Take 1 tablet by mouth Every 8 (Eight) Hours As Needed for Mild Pain ., Disp: 30 tablet, Rfl: 0    levocetirizine (XYZAL) 5 MG tablet, Take 1 tablet by mouth Every Evening., Disp: , Rfl:     lisinopril (PRINIVIL,ZESTRIL) 10 MG tablet, Take 1 tablet by mouth Daily., Disp: , Rfl:     metoprolol succinate XL (TOPROL-XL) 50 MG 24 hr tablet, Take 1  "tablet by mouth Daily., Disp: , Rfl:     mirtazapine (REMERON) 15 MG tablet, Take 1 tablet by mouth Every Night., Disp: , Rfl:     pantoprazole (PROTONIX) 40 MG EC tablet, Take 1 tablet by mouth Daily., Disp: , Rfl:     polyethylene glycol (ClearLax) 17 GM/SCOOP powder, Take 17 g by mouth Every 30 (Thirty) Days., Disp: , Rfl:     primidone (MYSOLINE) 250 MG tablet, Take 1 tablet by mouth 2 (Two) Times a Day., Disp: , Rfl:     risperiDONE (risperDAL) 1 MG tablet, Take 1 tablet by mouth 2 (Two) Times a Day., Disp: , Rfl:     sertraline (ZOLOFT) 50 MG tablet, Take 3 tablets by mouth Daily., Disp: , Rfl:     vitamin B-12 (CYANOCOBALAMIN) 1000 MCG tablet, Take 1 tablet by mouth Daily., Disp: , Rfl:      Physical Exam  Visit Vitals  /72 (BP Location: Left arm, Patient Position: Sitting, Cuff Size: Adult)   Pulse 66   Ht 180.3 cm (71\")   Wt 105 kg (232 lb)   SpO2 99%   BMI 32.36 kg/m²     Brief Urine Lab Results       None            Lab Results   Component Value Date    GLUCOSE 93 02/15/2023    CALCIUM 8.9 02/15/2023     02/15/2023    K 4.2 02/15/2023    CO2 29.2 (H) 02/15/2023     02/15/2023    BUN 14 02/15/2023    CREATININE 0.74 (L) 02/15/2023    BCR 18.9 02/15/2023    ANIONGAP 6.8 02/15/2023       Lab Results   Component Value Date    WBC 4.59 02/15/2023    HGB 11.9 (L) 02/15/2023    HCT 33.1 (L) 02/15/2023    MCV 89.2 02/15/2023     02/15/2023          No results found for: \"PSA\"          Radiographic Studies  No Images in the past 120 days found..      I have reviewed above labs and imaging.     Assessment  43 y.o. male with left grade 2 varicocele, but really had pelvic floor pain. No hernia on exam. Pain resolved after PFPT    Plan  1. FU PRN    "